# Patient Record
Sex: MALE | HISPANIC OR LATINO
[De-identification: names, ages, dates, MRNs, and addresses within clinical notes are randomized per-mention and may not be internally consistent; named-entity substitution may affect disease eponyms.]

---

## 2017-02-14 ENCOUNTER — CHARTING TRANS (OUTPATIENT)
Dept: OTHER | Age: 22
End: 2017-02-14

## 2018-11-05 VITALS
RESPIRATION RATE: 16 BRPM | TEMPERATURE: 99 F | HEART RATE: 88 BPM | HEIGHT: 70 IN | SYSTOLIC BLOOD PRESSURE: 120 MMHG | DIASTOLIC BLOOD PRESSURE: 72 MMHG | WEIGHT: 110.67 LBS | BODY MASS INDEX: 15.84 KG/M2

## 2019-10-11 ENCOUNTER — HOSPITAL (OUTPATIENT)
Dept: OTHER | Age: 24
End: 2019-10-11

## 2019-10-21 ENCOUNTER — TELEPHONE (OUTPATIENT)
Dept: SCHEDULING | Age: 24
End: 2019-10-21

## 2020-08-12 ENCOUNTER — TELEPHONE (OUTPATIENT)
Dept: SCHEDULING | Age: 25
End: 2020-08-12

## 2020-08-25 ENCOUNTER — OFFICE VISIT (OUTPATIENT)
Dept: FAMILY MEDICINE | Age: 25
End: 2020-08-25

## 2020-08-25 VITALS
TEMPERATURE: 97.2 F | DIASTOLIC BLOOD PRESSURE: 66 MMHG | SYSTOLIC BLOOD PRESSURE: 108 MMHG | BODY MASS INDEX: 19.47 KG/M2 | HEART RATE: 66 BPM | WEIGHT: 136 LBS | HEIGHT: 70 IN | RESPIRATION RATE: 16 BRPM

## 2020-08-25 DIAGNOSIS — Z00.00 ANNUAL PHYSICAL EXAM: Primary | ICD-10-CM

## 2020-08-25 PROCEDURE — 99385 PREV VISIT NEW AGE 18-39: CPT | Performed by: FAMILY MEDICINE

## 2020-08-25 RX ORDER — METHYLPREDNISOLONE 4 MG/1
TABLET ORAL
COMMUNITY
Start: 2020-08-15

## 2020-08-25 ASSESSMENT — PATIENT HEALTH QUESTIONNAIRE - PHQ9
SUM OF ALL RESPONSES TO PHQ9 QUESTIONS 1 AND 2: 0
1. LITTLE INTEREST OR PLEASURE IN DOING THINGS: NOT AT ALL
CLINICAL INTERPRETATION OF PHQ2 SCORE: NO FURTHER SCREENING NEEDED
2. FEELING DOWN, DEPRESSED OR HOPELESS: NOT AT ALL
CLINICAL INTERPRETATION OF PHQ9 SCORE: NO FURTHER SCREENING NEEDED
SUM OF ALL RESPONSES TO PHQ9 QUESTIONS 1 AND 2: 0

## 2023-10-29 PROCEDURE — 12014 RPR F/E/E/N/L/M 5.1-7.5 CM: CPT

## 2023-10-29 PROCEDURE — 93010 ELECTROCARDIOGRAM REPORT: CPT

## 2023-10-29 PROCEDURE — 99284 EMERGENCY DEPT VISIT MOD MDM: CPT

## 2023-10-29 PROCEDURE — 36415 COLL VENOUS BLD VENIPUNCTURE: CPT

## 2023-10-29 PROCEDURE — 93005 ELECTROCARDIOGRAM TRACING: CPT

## 2023-10-30 ENCOUNTER — APPOINTMENT (OUTPATIENT)
Dept: GENERAL RADIOLOGY | Facility: HOSPITAL | Age: 28
End: 2023-10-30
Attending: EMERGENCY MEDICINE

## 2023-10-30 ENCOUNTER — HOSPITAL ENCOUNTER (EMERGENCY)
Facility: HOSPITAL | Age: 28
Discharge: HOME | End: 2023-10-30
Attending: EMERGENCY MEDICINE

## 2023-10-30 VITALS
TEMPERATURE: 99 F | RESPIRATION RATE: 18 BRPM | DIASTOLIC BLOOD PRESSURE: 68 MMHG | OXYGEN SATURATION: 97 % | SYSTOLIC BLOOD PRESSURE: 126 MMHG | HEART RATE: 88 BPM

## 2023-10-30 DIAGNOSIS — S01.81XA FACIAL LACERATION, INITIAL ENCOUNTER: Primary | ICD-10-CM

## 2023-10-30 DIAGNOSIS — R55 SYNCOPE AND COLLAPSE: ICD-10-CM

## 2023-10-30 LAB
ALBUMIN SERPL-MCNC: 4.5 G/DL (ref 3.4–5)
ALP LIVER SERPL-CCNC: 85 U/L
ALT SERPL-CCNC: 19 U/L
ANION GAP SERPL CALC-SCNC: 8 MMOL/L (ref 0–18)
AST SERPL-CCNC: 22 U/L (ref 15–37)
ATRIAL RATE: 80 BPM
BASOPHILS # BLD AUTO: 0.02 X10(3) UL (ref 0–0.2)
BASOPHILS NFR BLD AUTO: 0.2 %
BILIRUB DIRECT SERPL-MCNC: 0.1 MG/DL (ref 0–0.2)
BILIRUB SERPL-MCNC: 0.5 MG/DL (ref 0.1–2)
BUN BLD-MCNC: 13 MG/DL (ref 7–18)
BUN/CREAT SERPL: 11.9 (ref 10–20)
CALCIUM BLD-MCNC: 9.7 MG/DL (ref 8.5–10.1)
CHLORIDE SERPL-SCNC: 101 MMOL/L (ref 98–112)
CO2 SERPL-SCNC: 28 MMOL/L (ref 21–32)
CREAT BLD-MCNC: 1.09 MG/DL
DEPRECATED RDW RBC AUTO: 38.7 FL (ref 35.1–46.3)
EGFRCR SERPLBLD CKD-EPI 2021: 96 ML/MIN/1.73M2 (ref 60–?)
EOSINOPHIL # BLD AUTO: 0.01 X10(3) UL (ref 0–0.7)
EOSINOPHIL NFR BLD AUTO: 0.1 %
ERYTHROCYTE [DISTWIDTH] IN BLOOD BY AUTOMATED COUNT: 12.3 % (ref 11–15)
GLUCOSE BLD-MCNC: 129 MG/DL (ref 70–99)
HCT VFR BLD AUTO: 45.7 %
HGB BLD-MCNC: 15.4 G/DL
IMM GRANULOCYTES # BLD AUTO: 0.03 X10(3) UL (ref 0–1)
IMM GRANULOCYTES NFR BLD: 0.3 %
LYMPHOCYTES # BLD AUTO: 0.9 X10(3) UL (ref 1–4)
LYMPHOCYTES NFR BLD AUTO: 8.3 %
MCH RBC QN AUTO: 28.8 PG (ref 26–34)
MCHC RBC AUTO-ENTMCNC: 33.7 G/DL (ref 31–37)
MCV RBC AUTO: 85.6 FL
MONOCYTES # BLD AUTO: 0.52 X10(3) UL (ref 0.1–1)
MONOCYTES NFR BLD AUTO: 4.8 %
NEUTROPHILS # BLD AUTO: 9.37 X10 (3) UL (ref 1.5–7.7)
NEUTROPHILS # BLD AUTO: 9.37 X10(3) UL (ref 1.5–7.7)
NEUTROPHILS NFR BLD AUTO: 86.3 %
OSMOLALITY SERPL CALC.SUM OF ELEC: 286 MOSM/KG (ref 275–295)
P AXIS: 29 DEGREES
P-R INTERVAL: 162 MS
PLATELET # BLD AUTO: 242 10(3)UL (ref 150–450)
POTASSIUM SERPL-SCNC: 3.8 MMOL/L (ref 3.5–5.1)
PROT SERPL-MCNC: 7.9 G/DL (ref 6.4–8.2)
Q-T INTERVAL: 366 MS
QRS DURATION: 82 MS
QTC CALCULATION (BEZET): 422 MS
R AXIS: 92 DEGREES
RBC # BLD AUTO: 5.34 X10(6)UL
SODIUM SERPL-SCNC: 137 MMOL/L (ref 136–145)
T AXIS: 51 DEGREES
TROPONIN I HIGH SENSITIVITY: 4 NG/L
VENTRICULAR RATE: 80 BPM
WBC # BLD AUTO: 10.9 X10(3) UL (ref 4–11)

## 2023-10-30 PROCEDURE — 80048 BASIC METABOLIC PNL TOTAL CA: CPT | Performed by: EMERGENCY MEDICINE

## 2023-10-30 PROCEDURE — 90471 IMMUNIZATION ADMIN: CPT

## 2023-10-30 PROCEDURE — 80076 HEPATIC FUNCTION PANEL: CPT

## 2023-10-30 PROCEDURE — 85025 COMPLETE CBC W/AUTO DIFF WBC: CPT

## 2023-10-30 PROCEDURE — 84484 ASSAY OF TROPONIN QUANT: CPT | Performed by: EMERGENCY MEDICINE

## 2023-10-30 PROCEDURE — 84484 ASSAY OF TROPONIN QUANT: CPT

## 2023-10-30 PROCEDURE — 85025 COMPLETE CBC W/AUTO DIFF WBC: CPT | Performed by: EMERGENCY MEDICINE

## 2023-10-30 PROCEDURE — 80048 BASIC METABOLIC PNL TOTAL CA: CPT

## 2023-10-30 PROCEDURE — 71045 X-RAY EXAM CHEST 1 VIEW: CPT | Performed by: EMERGENCY MEDICINE

## 2023-10-30 PROCEDURE — 80076 HEPATIC FUNCTION PANEL: CPT | Performed by: EMERGENCY MEDICINE

## 2023-10-30 RX ORDER — LIDOCAINE HYDROCHLORIDE 10 MG/ML
INJECTION, SOLUTION EPIDURAL; INFILTRATION; INTRACAUDAL; PERINEURAL
Status: COMPLETED
Start: 2023-10-30 | End: 2023-10-30

## 2023-10-30 RX ORDER — LIDOCAINE HYDROCHLORIDE 10 MG/ML
1 INJECTION, SOLUTION EPIDURAL; INFILTRATION; INTRACAUDAL; PERINEURAL ONCE
Status: COMPLETED | OUTPATIENT
Start: 2023-10-30 | End: 2023-10-30

## 2024-04-17 NOTE — ED INITIAL ASSESSMENT (HPI)
.caredu   Pt arrives through triage with complaints of witnessed syncopal episode, pt does not recall what happened, per friend pt hit the corner of the table. 2 lacs noted on left eyebrow- bleeding controlled. Reports slight dizziness/ double vision. Denies vomiting.

## 2024-09-25 ENCOUNTER — OFFICE VISIT (OUTPATIENT)
Dept: INTERNAL MEDICINE CLINIC | Facility: CLINIC | Age: 29
End: 2024-09-25
Payer: COMMERCIAL

## 2024-09-25 VITALS
WEIGHT: 130.38 LBS | HEIGHT: 69.29 IN | OXYGEN SATURATION: 99 % | TEMPERATURE: 99 F | RESPIRATION RATE: 16 BRPM | BODY MASS INDEX: 19.09 KG/M2 | SYSTOLIC BLOOD PRESSURE: 120 MMHG | DIASTOLIC BLOOD PRESSURE: 60 MMHG | HEART RATE: 81 BPM

## 2024-09-25 DIAGNOSIS — Z00.00 ENCOUNTER FOR ROUTINE ADULT MEDICAL EXAMINATION: Primary | ICD-10-CM

## 2024-09-25 DIAGNOSIS — Z00.00 LABORATORY EXAM ORDERED AS PART OF ROUTINE GENERAL MEDICAL EXAMINATION: ICD-10-CM

## 2024-09-25 DIAGNOSIS — S09.90XD TRAUMATIC INJURY OF HEAD, SUBSEQUENT ENCOUNTER: ICD-10-CM

## 2024-09-25 DIAGNOSIS — R51.9 NEW ONSET HEADACHE: ICD-10-CM

## 2024-09-25 PROCEDURE — 99385 PREV VISIT NEW AGE 18-39: CPT | Performed by: INTERNAL MEDICINE

## 2024-09-25 PROCEDURE — 99203 OFFICE O/P NEW LOW 30 MIN: CPT | Performed by: INTERNAL MEDICINE

## 2024-09-25 NOTE — PROGRESS NOTES
[facility] Internal Medicine Office Note  Chief Complaint:   Chief Complaint   Patient presents with    [facility]       HPI:   This is a 29 year old male coming in for [facility]  HPI    He notes he is overall healthy. No surgeries in the past. He does not take any medications regularly.     He hit his head about a year ago, 10/2023. He had hit his head on a table and had a concussion. He required stitches. He feels like he felt completely recovered a few days later.      For the past 2 months, he feels like he has been having anxiety and he wonders if he has ADHD. He has a daughter that has ADHD. He went to a psychiatrist a month ago and saw them twice. He was recommended to see a primary doctor     Headaches - he notes he gets tension headaches related to when he gets overwhelmed or stressed. Bilateral temples. Present for past 1-2 months and is new.   Denies weakness   Denies numbness or tingling   He is concerned about being related to his concussion.     He feels panic attacks and nervous feeling     He notes he had anxiety back in 2019. He feels like he has depersonalization, feeling like he is in a dream or disassociating. He feels like he \"got over it\" that year and notes it is more severe than back then.     He was recommended by his psychiatrist to get a medical evaluation for the headaches and in general     Tdap 10/30/2023    Psychiatry  [facility] in [place] - Dr. Christina Calvillo  [phone]  Patient gives permission for me to send records of blood work and brain imaging to psychiatrist        There is no problem list on file for this patient.    History reviewed. No pertinent surgical history.  Family History   Problem Relation Age of Onset    Thyroid Disorder Mother     Depression Mother     Diabetes Father     Thyroid Disorder Maternal Grandmother     Other (liver disease) Maternal Grandfather     Alcohol abuse Maternal Grandfather     Diabetes Paternal Grandmother      Other (lung cancer) Paternal Grandfather         smoker        I reviewed his's Past Medical History, Past Surgical History, Family History and   Social History updated shows  Social History     Socioeconomic History    Marital status: Single   Tobacco Use    Smoking status: Never    Smokeless tobacco: Never   Vaping Use    Vaping status: Former   Substance and Sexual Activity    Alcohol use: Not Currently    Drug use: Never   Other Topics Concern    Caffeine Concern No    Exercise Yes    Seat Belt No    Special Diet No    Stress Concern Yes    Weight Concern No     Social Determinants of Health      Received from [facility]     Allergies:  No Known Allergies  No current outpatient medications on file.         REVIEW OF SYSTEMS:   Review of Systems   Constitutional:  Negative for fever.   Eyes:  Negative for visual disturbance.   Respiratory:  Negative for shortness of breath.    Cardiovascular:  Negative for chest pain.   Gastrointestinal:  Negative for constipation.   Neurological: Negative.    Hematological: Negative.    Psychiatric/Behavioral: Negative.          EXAM:   /60 (BP Location: Left arm, Patient Position: Sitting, Cuff Size: adult)   Pulse 81   Temp 98.6 °F (37 °C) (Temporal)   Resp 16   Ht 5' 9.29\" (1.76 m)   Wt 130 lb 6.4 oz (59.1 kg)   SpO2 99%   BMI 19.10 kg/m²  Estimated body mass index is 19.1 kg/m² as calculated from the following:    Height as of this encounter: 5' 9.29\" (1.76 m).    Weight as of this encounter: 130 lb 6.4 oz (59.1 kg).   Vital signs reviewed. Appears stated age, well groomed.  Physical Exam  Vitals reviewed.   Constitutional:       General: He is not in acute distress.     Appearance: He is well-developed.   HENT:      Head: Normocephalic and atraumatic.      Right Ear: Tympanic membrane normal.      Left Ear: Tympanic membrane normal.   Eyes:      Conjunctiva/sclera: Conjunctivae normal.   Cardiovascular:      Rate and Rhythm: Normal rate and  regular rhythm.      Heart sounds: Normal heart sounds.   Pulmonary:      Effort: Pulmonary effort is normal.      Breath sounds: Normal breath sounds.   Abdominal:      Palpations: Abdomen is soft.      Tenderness: There is no abdominal tenderness.   Musculoskeletal:      Cervical back: Neck supple.      Right lower leg: No edema.      Left lower leg: No edema.   Skin:     General: Skin is warm and dry.   Neurological:      General: No focal deficit present.      Mental Status: He is alert.   Psychiatric:         Mood and Affect: Mood normal.          ASSESSMENT AND PLAN:   Haja Gerard is a 29 year old male with  1. Encounter for routine adult medical examination    2. Laboratory exam ordered as part of routine general medical examination    3. Traumatic injury of head, subsequent encounter    4. New onset headache          The plan is as follows  Haja was seen today for [facility] care.    Diagnoses and all orders for this visit:    Encounter for routine adult medical examination  -flu and covid vaccine recommended this fall  -Tdap was in 2023; up to date.   -he reports being up to date with childhood vaccines     Laboratory exam ordered as part of routine general medical examination  -     Comp Metabolic Panel (14); Future  -     CBC With Differential With Platelet; Future  -     Lipid Panel; Future  -     TSH W Reflex To Free T4; Future    Traumatic injury of head, subsequent encounter and   -     [facility] MRI BRAIN (CPT=70551); Future  -     [facility] MRI BRAIN (CPT=70551)  New onset headache - recommend MRI brain for further evaluation of new headaches in the setting of history of concussion and fall less than a year ago.   -     [facility] MRI BRAIN (CPT=70551); Future  -     [facility] MRI BRAIN (CPT=70551)      Orders Placed This Encounter   Procedures    Comp Metabolic Panel (14)    CBC With Differential With Platelet    Lipid Panel    TSH W Reflex To Free T4       Meds & Refills for this Visit:  Requested  Prescriptions      No prescriptions requested or ordered in this encounter       Imaging & Consults:  MRI BRAIN (CPT=70551)    Health Maintenance Due   Topic Date Due    Annual Physical  Never done    COVID-19 Vaccine (1 - 2023-24 season) Never done     Patient/Caregiver Education: Patient/Caregiver Education: There are no barriers to learning. Medical education done. Outcome: Patient verbalizes understanding. Patient is notified to call with any questions, complications, allergies, or worsening or changing symptoms.  Patient is to call with any side effects or complications from the treatments as a result of today.     Lynda Benavides MD

## 2024-10-14 ENCOUNTER — LAB ENCOUNTER (OUTPATIENT)
Dept: LAB | Age: 29
End: 2024-10-14
Attending: INTERNAL MEDICINE
Payer: COMMERCIAL

## 2024-10-14 DIAGNOSIS — Z00.00 LABORATORY EXAM ORDERED AS PART OF ROUTINE GENERAL MEDICAL EXAMINATION: ICD-10-CM

## 2024-10-14 LAB
ALBUMIN SERPL-MCNC: 5 G/DL (ref 3.2–4.8)
ALBUMIN/GLOB SERPL: 1.8 {RATIO} (ref 1–2)
ALP LIVER SERPL-CCNC: 108 U/L
ALT SERPL-CCNC: 15 U/L
ANION GAP SERPL CALC-SCNC: 7 MMOL/L (ref 0–18)
AST SERPL-CCNC: 21 U/L (ref ?–34)
BASOPHILS # BLD AUTO: 0.02 X10(3) UL (ref 0–0.2)
BASOPHILS NFR BLD AUTO: 0.3 %
BILIRUB SERPL-MCNC: 0.6 MG/DL (ref 0.3–1.2)
BUN BLD-MCNC: 11 MG/DL (ref 9–23)
CALCIUM BLD-MCNC: 10.2 MG/DL (ref 8.7–10.4)
CHLORIDE SERPL-SCNC: 103 MMOL/L (ref 98–112)
CHOLEST SERPL-MCNC: 173 MG/DL (ref ?–200)
CO2 SERPL-SCNC: 28 MMOL/L (ref 21–32)
CREAT BLD-MCNC: 0.96 MG/DL
EGFRCR SERPLBLD CKD-EPI 2021: 111 ML/MIN/1.73M2 (ref 60–?)
EOSINOPHIL # BLD AUTO: 0.09 X10(3) UL (ref 0–0.7)
EOSINOPHIL NFR BLD AUTO: 1.3 %
ERYTHROCYTE [DISTWIDTH] IN BLOOD BY AUTOMATED COUNT: 12.1 %
FASTING PATIENT LIPID ANSWER: YES
FASTING STATUS PATIENT QL REPORTED: YES
GLOBULIN PLAS-MCNC: 2.8 G/DL (ref 2–3.5)
GLUCOSE BLD-MCNC: 112 MG/DL (ref 70–99)
HCT VFR BLD AUTO: 46.5 %
HDLC SERPL-MCNC: 62 MG/DL (ref 40–59)
HGB BLD-MCNC: 16.2 G/DL
IMM GRANULOCYTES # BLD AUTO: 0.01 X10(3) UL (ref 0–1)
IMM GRANULOCYTES NFR BLD: 0.1 %
LDLC SERPL CALC-MCNC: 88 MG/DL (ref ?–100)
LYMPHOCYTES # BLD AUTO: 2.34 X10(3) UL (ref 1–4)
LYMPHOCYTES NFR BLD AUTO: 34.3 %
MCH RBC QN AUTO: 29.8 PG (ref 26–34)
MCHC RBC AUTO-ENTMCNC: 34.8 G/DL (ref 31–37)
MCV RBC AUTO: 85.5 FL
MONOCYTES # BLD AUTO: 0.4 X10(3) UL (ref 0.1–1)
MONOCYTES NFR BLD AUTO: 5.9 %
NEUTROPHILS # BLD AUTO: 3.96 X10 (3) UL (ref 1.5–7.7)
NEUTROPHILS # BLD AUTO: 3.96 X10(3) UL (ref 1.5–7.7)
NEUTROPHILS NFR BLD AUTO: 58.1 %
NONHDLC SERPL-MCNC: 111 MG/DL (ref ?–130)
OSMOLALITY SERPL CALC.SUM OF ELEC: 286 MOSM/KG (ref 275–295)
PLATELET # BLD AUTO: 253 10(3)UL (ref 150–450)
POTASSIUM SERPL-SCNC: 3.6 MMOL/L (ref 3.5–5.1)
PROT SERPL-MCNC: 7.8 G/DL (ref 5.7–8.2)
RBC # BLD AUTO: 5.44 X10(6)UL
SODIUM SERPL-SCNC: 138 MMOL/L (ref 136–145)
TRIGL SERPL-MCNC: 135 MG/DL (ref 30–149)
TSI SER-ACNC: 3.41 MIU/ML (ref 0.55–4.78)
VLDLC SERPL CALC-MCNC: 22 MG/DL (ref 0–30)
WBC # BLD AUTO: 6.8 X10(3) UL (ref 4–11)

## 2024-10-14 PROCEDURE — 83036 HEMOGLOBIN GLYCOSYLATED A1C: CPT | Performed by: INTERNAL MEDICINE

## 2024-10-14 PROCEDURE — 36415 COLL VENOUS BLD VENIPUNCTURE: CPT

## 2024-10-14 PROCEDURE — 85025 COMPLETE CBC W/AUTO DIFF WBC: CPT

## 2024-10-14 PROCEDURE — 80061 LIPID PANEL: CPT

## 2024-10-14 PROCEDURE — 84443 ASSAY THYROID STIM HORMONE: CPT

## 2024-10-14 PROCEDURE — 80053 COMPREHEN METABOLIC PANEL: CPT

## 2024-10-16 DIAGNOSIS — R73.01 ELEVATED FASTING GLUCOSE: Primary | ICD-10-CM

## 2024-10-16 LAB
EST. AVERAGE GLUCOSE BLD GHB EST-MCNC: 97 MG/DL (ref 68–126)
HBA1C MFR BLD: 5 % (ref ?–5.7)

## 2025-02-05 ENCOUNTER — OFFICE VISIT (OUTPATIENT)
Dept: INTERNAL MEDICINE CLINIC | Facility: CLINIC | Age: 30
End: 2025-02-05
Payer: COMMERCIAL

## 2025-02-05 VITALS
WEIGHT: 126.19 LBS | BODY MASS INDEX: 18.69 KG/M2 | SYSTOLIC BLOOD PRESSURE: 122 MMHG | RESPIRATION RATE: 16 BRPM | DIASTOLIC BLOOD PRESSURE: 70 MMHG | TEMPERATURE: 98 F | HEART RATE: 87 BPM | OXYGEN SATURATION: 99 % | HEIGHT: 69 IN

## 2025-02-05 DIAGNOSIS — S09.90XD TRAUMATIC INJURY OF HEAD, SUBSEQUENT ENCOUNTER: Primary | ICD-10-CM

## 2025-02-05 DIAGNOSIS — G44.329 CHRONIC POST-TRAUMATIC HEADACHE, NOT INTRACTABLE: ICD-10-CM

## 2025-02-05 PROCEDURE — 99213 OFFICE O/P EST LOW 20 MIN: CPT | Performed by: INTERNAL MEDICINE

## 2025-02-05 RX ORDER — ESCITALOPRAM OXALATE 5 MG/1
TABLET ORAL
COMMUNITY
Start: 2025-01-29

## 2025-02-05 NOTE — PROGRESS NOTES
[facility] Internal Medicine Office Note  Chief Complaint:   No chief complaint on file.      HPI:   This is a 29 year old male coming in for follow up  HPI     Last appointment was in September 2024.  Brain MRI was ordered but patient did not complete.  Indication for ordering was due to history of concussion after a fall a year ago and evaluation of new headaches at that time. He notes [facility] was not in network for him     He continues to have headaches although they are mildly improved from previous.     He sees psychiatry and had medication change from sertraline to escitalopram which he hasn't started yet.  He stopped the sertraline. He feels like he didn't react well to it.   He was diagnosed with generalized anxiety disorder and post traumatic stress/ complex trauma.       There is no problem list on file for this patient.    History reviewed. No pertinent surgical history.  Family History   Problem Relation Age of Onset    Thyroid Disorder Mother     Depression Mother     Diabetes Father     Thyroid Disorder Maternal Grandmother     Other (liver disease) Maternal Grandfather     Alcohol abuse Maternal Grandfather     Diabetes Paternal Grandmother     Other (lung cancer) Paternal Grandfather         smoker        I reviewed his's Past Medical History, Past Surgical History, Family History and   Social History updated shows  Social History     Socioeconomic History    Marital status: Single   Tobacco Use    Smoking status: Never    Smokeless tobacco: Never   Vaping Use    Vaping status: Former   Substance and Sexual Activity    Alcohol use: Not Currently    Drug use: Never   Other Topics Concern    Caffeine Concern No    Exercise Yes    Seat Belt No    Special Diet No    Stress Concern Yes    Weight Concern No     Social Drivers of Health     Food Insecurity: No Food Insecurity (2/5/2025)    NCSS - Food Insecurity     Worried About Running Out of Food in the Last Year: No     Ran Out of Food  in the Last Year: No   Transportation Needs: No Transportation Needs (2/5/2025)    NCSS - Transportation     Lack of Transportation: No   Housing Stability: Not At Risk (2/5/2025)    NCSS - Housing/Utilities     Has Housing: Yes     Worried About Losing Housing: No     Unable to Get Utilities: No     Allergies:  Allergies[1]  Current Outpatient Medications   Medication Sig Dispense Refill    escitalopram 5 MG Oral Tab  (Patient not taking: Reported on 2/5/2025)           REVIEW OF SYSTEMS:   Review of Systems   Constitutional:  Negative for fever.   Eyes:  Negative for visual disturbance.   Respiratory:  Negative for shortness of breath.    Cardiovascular:  Negative for chest pain.   Gastrointestinal:  Negative for constipation.   Neurological: Negative.    Hematological: Negative.    Psychiatric/Behavioral: Negative.          EXAM:   /70   Pulse 87   Temp 98.3 °F (36.8 °C) (Temporal)   Resp 16   Ht 5' 9\" (1.753 m)   Wt 126 lb 3.2 oz (57.2 kg)   SpO2 99%   BMI 18.64 kg/m²  Estimated body mass index is 18.64 kg/m² as calculated from the following:    Height as of this encounter: 5' 9\" (1.753 m).    Weight as of this encounter: 126 lb 3.2 oz (57.2 kg).   Vital signs reviewed. Appears stated age, well groomed.  Physical Exam  Vitals reviewed.   Constitutional:       General: He is not in acute distress.     Appearance: He is well-developed.   HENT:      Head: Normocephalic and atraumatic.   Eyes:      Conjunctiva/sclera: Conjunctivae normal.   Cardiovascular:      Rate and Rhythm: Normal rate and regular rhythm.      Heart sounds: Normal heart sounds.   Pulmonary:      Effort: Pulmonary effort is normal.      Breath sounds: Normal breath sounds.   Musculoskeletal:      Cervical back: Neck supple.   Skin:     General: Skin is warm and dry.   Neurological:      General: No focal deficit present.      Mental Status: He is alert.   Psychiatric:         Mood and Affect: Mood normal.          ASSESSMENT AND  PLAN:   Haja Gerard is a 29 year old male with  1. Traumatic injury of head, subsequent encounter    2. New onset headache          The plan is as follows  Diagnoses and all orders for this visit:    Traumatic injury of head, subsequent encounter and   -     MRI BRAIN (CPT=70551); Future     Chronic post-traumatic headache, not intractable  - recommend MRI brain for further evaluation since headaches persist.        -     MRI BRAIN (CPT=70551); Future      Meds & Refills for this Visit:  Requested Prescriptions      No prescriptions requested or ordered in this encounter       Imaging & Consults:  MRI BRAIN (CPT=70551)    F/u 9/2025 for annual physical     Health Maintenance Due   Topic Date Due    COVID-19 Vaccine (1 - 2024-25 season) Never done    Influenza Vaccine (1) Never done     Patient/Caregiver Education: Patient/Caregiver Education: There are no barriers to learning. Medical education done. Outcome: Patient verbalizes understanding. Patient is notified to call with any questions, complications, allergies, or worsening or changing symptoms.  Patient is to call with any side effects or complications from the treatments as a result of today.     Lynda Benavides MD         [1] No Known Allergies

## 2025-02-21 ENCOUNTER — HOSPITAL ENCOUNTER (OUTPATIENT)
Dept: MRI IMAGING | Facility: HOSPITAL | Age: 30
Discharge: HOME | End: 2025-02-21
Attending: INTERNAL MEDICINE
Payer: COMMERCIAL

## 2025-02-21 DIAGNOSIS — S09.90XD TRAUMATIC INJURY OF HEAD, SUBSEQUENT ENCOUNTER: ICD-10-CM

## 2025-02-21 DIAGNOSIS — G44.329 CHRONIC POST-TRAUMATIC HEADACHE, NOT INTRACTABLE: ICD-10-CM

## 2025-02-21 PROCEDURE — 70551 MRI BRAIN STEM W/O DYE: CPT | Performed by: INTERNAL MEDICINE

## 2025-02-25 ENCOUNTER — TELEPHONE (OUTPATIENT)
Dept: INTERNAL MEDICINE CLINIC | Facility: CLINIC | Age: 30
End: 2025-02-25

## 2025-02-25 NOTE — TELEPHONE ENCOUNTER
Spoke with patient regarding normal MRI per Dr. Benavides. Informed patient regarding MyChart settings. Patient verbalized understanding.